# Patient Record
Sex: MALE | Race: WHITE | NOT HISPANIC OR LATINO | URBAN - METROPOLITAN AREA
[De-identification: names, ages, dates, MRNs, and addresses within clinical notes are randomized per-mention and may not be internally consistent; named-entity substitution may affect disease eponyms.]

---

## 2024-01-16 ENCOUNTER — OFFICE VISIT (OUTPATIENT)
Dept: URBAN - METROPOLITAN AREA CLINIC 9 | Facility: CLINIC | Age: 64
End: 2024-01-16
Payer: COMMERCIAL

## 2024-01-16 VITALS
SYSTOLIC BLOOD PRESSURE: 120 MMHG | HEIGHT: 71 IN | DIASTOLIC BLOOD PRESSURE: 70 MMHG | BODY MASS INDEX: 28.42 KG/M2 | WEIGHT: 203 LBS

## 2024-01-16 DIAGNOSIS — K59.09 CHRONIC CONSTIPATION: ICD-10-CM

## 2024-01-16 DIAGNOSIS — K62.89 RECTAL PAIN: ICD-10-CM

## 2024-01-16 DIAGNOSIS — R19.7 OVERFLOW DIARRHEA: ICD-10-CM

## 2024-01-16 PROBLEM — 162106006: Status: ACTIVE | Noted: 2024-01-16

## 2024-01-16 PROBLEM — 236069009: Status: ACTIVE | Noted: 2024-01-16

## 2024-01-16 PROBLEM — 77880009: Status: ACTIVE | Noted: 2024-01-16

## 2024-01-16 PROCEDURE — 99203 OFFICE O/P NEW LOW 30 MIN: CPT | Performed by: STUDENT IN AN ORGANIZED HEALTH CARE EDUCATION/TRAINING PROGRAM

## 2024-01-16 RX ORDER — LISINOPRIL AND HYDROCHLOROTHIAZIDE 20; 25 MG/1; MG/1
1 TABLET TABLET ORAL ONCE A DAY
Status: ACTIVE | COMMUNITY

## 2024-01-16 RX ORDER — ATORVASTATIN CALCIUM 40 MG/1
1 TABLET TABLET, FILM COATED ORAL ONCE A DAY
Status: ACTIVE | COMMUNITY

## 2024-01-16 NOTE — HPI-TODAY'S VISIT:
64 YO Female presents with chronic constipation and rectal pain for years.  Has been havign to annale[employer] miralax daily and trying to eat much fiber.   Previously has tried short stints of miralax, advised for miralax daily BID and high fiber diet.  Will move up to [employer] if not working after 1 month.  Drinks lots of water daily.  No current alarm symptoms.    ALARM SYMPTOMS --No odynophagia --No hematemesis --No melena / hematochezia --No unintentional weight loss --No iron deficiency anemia  PERTINENT GI FAMILY HISTORY Colon polyps:  no family history Colon cancer:  no family history   GASTROINTESTINAL PROCEDURE HISTORY Colonoscopy:	 --never had colonoscopy EGD:   --never had EGD	  PERTINENT MEDICAL HISTORY Aspirin 81mg PO daily:   --Not Taking Other Blood Thinners:   Cardiac Disease:   --No History Pulmonary Disease --No History Abdominal Surgery & Hernia:  No History

## 2024-02-01 ENCOUNTER — OV EP (OUTPATIENT)
Dept: URBAN - METROPOLITAN AREA CLINIC 9 | Facility: CLINIC | Age: 64
End: 2024-02-01
Payer: COMMERCIAL

## 2024-02-01 ENCOUNTER — LAB (OUTPATIENT)
Dept: URBAN - METROPOLITAN AREA CLINIC 9 | Facility: CLINIC | Age: 64
End: 2024-02-01

## 2024-02-01 VITALS
BODY MASS INDEX: 28.14 KG/M2 | HEIGHT: 71 IN | DIASTOLIC BLOOD PRESSURE: 80 MMHG | SYSTOLIC BLOOD PRESSURE: 130 MMHG | WEIGHT: 201 LBS

## 2024-02-01 DIAGNOSIS — K59.09 CHRONIC CONSTIPATION: ICD-10-CM

## 2024-02-01 DIAGNOSIS — K62.89 RECTAL PAIN: ICD-10-CM

## 2024-02-01 DIAGNOSIS — K92.1 MELENA: ICD-10-CM

## 2024-02-01 PROBLEM — 2901004: Status: ACTIVE | Noted: 2024-02-01

## 2024-02-01 PROCEDURE — 99213 OFFICE O/P EST LOW 20 MIN: CPT | Performed by: STUDENT IN AN ORGANIZED HEALTH CARE EDUCATION/TRAINING PROGRAM

## 2024-02-01 RX ORDER — ATORVASTATIN CALCIUM 40 MG/1
1 TABLET TABLET, FILM COATED ORAL ONCE A DAY
Status: ACTIVE | COMMUNITY

## 2024-02-01 RX ORDER — LISINOPRIL AND HYDROCHLOROTHIAZIDE 20; 25 MG/1; MG/1
1 TABLET TABLET ORAL ONCE A DAY
Status: ACTIVE | COMMUNITY

## 2024-02-01 RX ORDER — LINACLOTIDE 72 UG/1
1 CAPSULE AT LEAST 30 MINUTES BEFORE THE FIRST MEAL OF THE DAY ON AN EMPTY STOMACH CAPSULE, GELATIN COATED ORAL ONCE A DAY
Qty: 30 | Refills: 5 | OUTPATIENT
Start: 2024-02-01 | End: 2024-07-30

## 2024-02-01 NOTE — HPI-TODAY'S VISIT:
62 YO Female presents with chronic constipation and rectal pain for years.  Has been havign to annalee miralax daily and trying to eat much fiber.   Previously has tried short stints of miralax, advised for miralax daily BID and high fiber diet.  Will move up to [employer] if not working after 1 month.  Drinks lots of water daily.  No current alarm symptoms.    IH 2/1/24: f/u for constipation.  States he is having more dark colored stools and chronic constipation and rectal pain.  Advised patient that he should have a flex sig for evalaution of bleeding lesion and hemorrhoiods.  [facility] banding to follow if needed.  Recommend HC supoositories and cream.  PERTINENT GI FAMILY HISTORY Colon polyps:  no family history Colon cancer:  no family history   GASTROINTESTINAL PROCEDURE HISTORY Colonoscopy:	 --3 years ago EGD:   --never had EGD	  PERTINENT MEDICAL HISTORY Aspirin 81mg PO daily:   --Not Taking Other Blood Thinners:   Cardiac Disease:   --No History Pulmonary Disease --No History Abdominal Surgery & Hernia:  No History

## 2024-02-07 ENCOUNTER — FLEX SIG (OUTPATIENT)
Dept: URBAN - METROPOLITAN AREA SURGERY CENTER 9 | Facility: SURGERY CENTER | Age: 64
End: 2024-02-07

## 2024-02-14 ENCOUNTER — FLEX SIG (OUTPATIENT)
Dept: URBAN - METROPOLITAN AREA SURGERY CENTER 9 | Facility: SURGERY CENTER | Age: 64
End: 2024-02-14
Payer: COMMERCIAL

## 2024-02-14 DIAGNOSIS — K57.30 DIVERTICULOSIS OF LARGE INTESTINE WITHOUT PERFORATION OR ABSCESS WITHOUT BLEEDING: ICD-10-CM

## 2024-02-14 DIAGNOSIS — K92.1 HEMATOCHEZIA: ICD-10-CM

## 2024-02-14 DIAGNOSIS — K64.1 SECOND DEGREE HEMORRHOIDS: ICD-10-CM

## 2024-02-14 DIAGNOSIS — R10.84 GENERALIZED ABDOMINAL PAIN: ICD-10-CM

## 2024-02-14 PROCEDURE — 45330 DIAGNOSTIC SIGMOIDOSCOPY: CPT | Performed by: STUDENT IN AN ORGANIZED HEALTH CARE EDUCATION/TRAINING PROGRAM

## 2024-02-14 RX ORDER — LINACLOTIDE 72 UG/1
1 CAPSULE AT LEAST 30 MINUTES BEFORE THE FIRST MEAL OF THE DAY ON AN EMPTY STOMACH CAPSULE, GELATIN COATED ORAL ONCE A DAY
Qty: 30 | Refills: 5 | Status: ACTIVE | COMMUNITY
Start: 2024-02-01 | End: 2024-07-30

## 2024-02-14 RX ORDER — ATORVASTATIN CALCIUM 40 MG/1
1 TABLET TABLET, FILM COATED ORAL ONCE A DAY
Status: ACTIVE | COMMUNITY

## 2024-02-14 RX ORDER — LISINOPRIL AND HYDROCHLOROTHIAZIDE 20; 25 MG/1; MG/1
1 TABLET TABLET ORAL ONCE A DAY
Status: ACTIVE | COMMUNITY

## 2024-03-04 ENCOUNTER — HEM (OUTPATIENT)
Dept: URBAN - METROPOLITAN AREA CLINIC 9 | Facility: CLINIC | Age: 64
End: 2024-03-04
Payer: COMMERCIAL

## 2024-03-04 VITALS
BODY MASS INDEX: 28.14 KG/M2 | DIASTOLIC BLOOD PRESSURE: 70 MMHG | WEIGHT: 201 LBS | SYSTOLIC BLOOD PRESSURE: 120 MMHG | HEIGHT: 71 IN

## 2024-03-04 DIAGNOSIS — K64.1 GRADE II HEMORRHOIDS: ICD-10-CM

## 2024-03-04 PROCEDURE — 46221 LIGATION OF HEMORRHOID(S): CPT | Performed by: STUDENT IN AN ORGANIZED HEALTH CARE EDUCATION/TRAINING PROGRAM

## 2024-03-04 RX ORDER — ATORVASTATIN CALCIUM 40 MG/1
1 TABLET TABLET, FILM COATED ORAL ONCE A DAY
Status: ACTIVE | COMMUNITY

## 2024-03-04 RX ORDER — LISINOPRIL AND HYDROCHLOROTHIAZIDE 20; 25 MG/1; MG/1
1 TABLET TABLET ORAL ONCE A DAY
Status: ACTIVE | COMMUNITY

## 2024-03-04 RX ORDER — LINACLOTIDE 72 UG/1
1 CAPSULE AT LEAST 30 MINUTES BEFORE THE FIRST MEAL OF THE DAY ON AN EMPTY STOMACH CAPSULE, GELATIN COATED ORAL ONCE A DAY
Qty: 90 | Refills: 3 | Status: ACTIVE | COMMUNITY
Start: 2024-02-01 | End: 2025-02-16

## 2024-03-04 NOTE — HPI-TODAY'S VISIT:
64 YO Female presents with chronic constipation and rectal pain for years.  Has been havign to annalee miralax daily and trying to eat much fiber.   Previously has tried short stints of miralax, advised for miralax daily BID and high fiber diet.  Will move up to [employer] if not working after 1 month.  Drinks lots of water daily.  No current alarm symptoms.    IH 2/1/24: f/u for constipation.  States he is having more dark colored stools and chronic constipation and rectal pain.  Advised patient that he should have a flex sig for evalaution of bleeding lesion and hemorrhoiods.  [facility] banding to follow if needed.  Recommend HC supoositories and cream.  IH 3/4/24: [facility] Banding #1, RA Banded, no pain or pressure at end of banding.  Repeat q2 weeks x 3.   PERTINENT GI FAMILY HISTORY Colon polyps:  no family history Colon cancer:  no family history   GASTROINTESTINAL PROCEDURE HISTORY Colonoscopy:	 --3 years ago Flex Sig -- 2024 EGD:   --never had EGD	  PERTINENT MEDICAL HISTORY Aspirin 81mg PO daily:   --Not Taking Other Blood Thinners:   Cardiac Disease:   --No History Pulmonary Disease --No History Abdominal Surgery & Hernia:  No History

## 2024-03-21 ENCOUNTER — HEM (OUTPATIENT)
Dept: URBAN - METROPOLITAN AREA CLINIC 9 | Facility: CLINIC | Age: 64
End: 2024-03-21
Payer: COMMERCIAL

## 2024-03-21 VITALS
HEIGHT: 71 IN | SYSTOLIC BLOOD PRESSURE: 120 MMHG | WEIGHT: 201 LBS | DIASTOLIC BLOOD PRESSURE: 70 MMHG | BODY MASS INDEX: 28.14 KG/M2

## 2024-03-21 DIAGNOSIS — K64.1 GRADE II HEMORRHOIDS: ICD-10-CM

## 2024-03-21 PROCEDURE — 46221 LIGATION OF HEMORRHOID(S): CPT | Performed by: STUDENT IN AN ORGANIZED HEALTH CARE EDUCATION/TRAINING PROGRAM

## 2024-03-21 RX ORDER — LISINOPRIL AND HYDROCHLOROTHIAZIDE 20; 25 MG/1; MG/1
1 TABLET TABLET ORAL ONCE A DAY
Status: ACTIVE | COMMUNITY

## 2024-03-21 RX ORDER — LINACLOTIDE 72 UG/1
1 CAPSULE AT LEAST 30 MINUTES BEFORE THE FIRST MEAL OF THE DAY ON AN EMPTY STOMACH CAPSULE, GELATIN COATED ORAL ONCE A DAY
Qty: 90 | Refills: 3 | Status: ACTIVE | COMMUNITY
Start: 2024-02-01 | End: 2025-02-16

## 2024-03-21 RX ORDER — ATORVASTATIN CALCIUM 40 MG/1
1 TABLET TABLET, FILM COATED ORAL ONCE A DAY
Status: ACTIVE | COMMUNITY

## 2024-03-21 NOTE — HPI-TODAY'S VISIT:
64 YO Female presents with chronic constipation and rectal pain for years.  Has been havign to annalee miralax daily and trying to eat much fiber.   Previously has tried short stints of miralax, advised for miralax daily BID and high fiber diet.  Will move up to [employer] if not working after 1 month.  Drinks lots of water daily.  No current alarm symptoms.    IH 2/1/24: f/u for constipation.  States he is having more dark colored stools and chronic constipation and rectal pain.  Advised patient that he should have a flex sig for evalaution of bleeding lesion and hemorrhoiods.  [facility] banding to follow if needed.  Recommend HC supoositories and cream.  IH 3/4/24: [facility] Banding #1, RA Banded, no pain or pressure at end of banding.  Repeat q2 weeks x 3.   IH 3/21/24: [facility] Banding #2, RP Banded, no pain or pressure at the end of banding.  Repeat q2 weeks x 3.  PERTINENT GI FAMILY HISTORY Colon polyps:  no family history Colon cancer:  no family history   GASTROINTESTINAL PROCEDURE HISTORY Colonoscopy:	 --3 years ago Flex Sig -- 2024 EGD:   --never had EGD	  PERTINENT MEDICAL HISTORY Aspirin 81mg PO daily:   --Not Taking Other Blood Thinners:   Cardiac Disease:   --No History Pulmonary Disease --No History Abdominal Surgery & Hernia:  No History

## 2024-04-12 ENCOUNTER — HEM (OUTPATIENT)
Dept: URBAN - METROPOLITAN AREA CLINIC 9 | Facility: CLINIC | Age: 64
End: 2024-04-12
Payer: COMMERCIAL

## 2024-04-12 VITALS
BODY MASS INDEX: 27.86 KG/M2 | SYSTOLIC BLOOD PRESSURE: 130 MMHG | HEIGHT: 71 IN | DIASTOLIC BLOOD PRESSURE: 78 MMHG | WEIGHT: 199 LBS

## 2024-04-12 DIAGNOSIS — K64.1 GRADE II HEMORRHOIDS: ICD-10-CM

## 2024-04-12 PROCEDURE — 46221 LIGATION OF HEMORRHOID(S): CPT | Performed by: INTERNAL MEDICINE

## 2024-04-12 RX ORDER — LINACLOTIDE 72 UG/1
1 CAPSULE AT LEAST 30 MINUTES BEFORE THE FIRST MEAL OF THE DAY ON AN EMPTY STOMACH CAPSULE, GELATIN COATED ORAL ONCE A DAY
Qty: 90 | Refills: 3 | Status: ACTIVE | COMMUNITY
Start: 2024-02-01 | End: 2025-02-16

## 2024-04-12 RX ORDER — ATORVASTATIN CALCIUM 40 MG/1
1 TABLET TABLET, FILM COATED ORAL ONCE A DAY
Status: ACTIVE | COMMUNITY

## 2024-04-12 RX ORDER — LISINOPRIL AND HYDROCHLOROTHIAZIDE 20; 25 MG/1; MG/1
1 TABLET TABLET ORAL ONCE A DAY
Status: ACTIVE | COMMUNITY

## 2024-10-18 ENCOUNTER — TELEPHONE ENCOUNTER (OUTPATIENT)
Dept: URBAN - METROPOLITAN AREA CLINIC 9 | Facility: CLINIC | Age: 64
End: 2024-10-18

## 2024-10-18 ENCOUNTER — P2P PATIENT RECORD (OUTPATIENT)
Age: 64
End: 2024-10-18

## 2025-02-18 ENCOUNTER — TELEPHONE ENCOUNTER (OUTPATIENT)
Dept: URBAN - METROPOLITAN AREA CLINIC 9 | Facility: CLINIC | Age: 65
End: 2025-02-18

## 2025-02-18 RX ORDER — LINACLOTIDE 72 UG/1
1 CAPSULE AT LEAST 30 MINUTES BEFORE THE FIRST MEAL OF THE DAY ON AN EMPTY STOMACH CAPSULE, GELATIN COATED ORAL ONCE A DAY
Qty: 90 | Refills: 0
Start: 2024-02-01 | End: 2025-05-19